# Patient Record
Sex: FEMALE | Race: WHITE | NOT HISPANIC OR LATINO | Employment: PART TIME | URBAN - METROPOLITAN AREA
[De-identification: names, ages, dates, MRNs, and addresses within clinical notes are randomized per-mention and may not be internally consistent; named-entity substitution may affect disease eponyms.]

---
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
## 2023-03-15 PROBLEM — R53.83 FATIGUE: Status: ACTIVE | Noted: 2023-03-15

## 2023-03-15 PROBLEM — N94.6 DYSMENORRHEA: Status: ACTIVE | Noted: 2023-03-15

## 2023-03-15 PROBLEM — E55.9 VITAMIN D DEFICIENCY: Status: ACTIVE | Noted: 2023-03-15

## 2023-03-15 PROBLEM — F32.A DEPRESSION: Status: ACTIVE | Noted: 2023-03-15

## 2023-03-15 PROBLEM — E66.9 OBESITY, CLASS I, BMI 30-34.9: Status: ACTIVE | Noted: 2023-03-15

## 2023-03-15 PROBLEM — J30.9 ALLERGIC RHINITIS: Status: ACTIVE | Noted: 2023-03-15

## 2023-03-15 PROBLEM — A49.8 CLOSTRIDIOIDES DIFFICILE INFECTION: Status: ACTIVE | Noted: 2023-03-15

## 2023-03-15 PROBLEM — E78.2 HYPERCHOLESTEROLEMIA WITH HYPERTRIGLYCERIDEMIA: Status: ACTIVE | Noted: 2023-03-15

## 2023-03-15 PROBLEM — R10.9 ABDOMINAL PAIN: Status: ACTIVE | Noted: 2023-03-15

## 2023-03-15 PROBLEM — E66.811 OBESITY, CLASS I, BMI 30-34.9: Status: ACTIVE | Noted: 2023-03-15

## 2023-03-15 PROBLEM — E28.2 POLYCYSTIC OVARIAN SYNDROME: Status: ACTIVE | Noted: 2023-03-15

## 2023-03-15 PROBLEM — L70.9 ACNE: Status: ACTIVE | Noted: 2023-03-15

## 2023-03-15 PROBLEM — M79.671 PAIN ASSOCIATED WITH ACCESSORY NAVICULAR BONE OF RIGHT FOOT: Status: ACTIVE | Noted: 2023-03-15

## 2023-03-15 PROBLEM — Q74.2 PAIN ASSOCIATED WITH ACCESSORY NAVICULAR BONE OF RIGHT FOOT: Status: ACTIVE | Noted: 2023-03-15

## 2023-03-15 PROBLEM — R63.5 WEIGHT GAIN: Status: ACTIVE | Noted: 2023-03-15

## 2023-03-15 PROBLEM — R63.4 WEIGHT LOSS: Status: ACTIVE | Noted: 2023-03-15

## 2023-03-15 PROBLEM — F41.9 ANXIETY: Status: ACTIVE | Noted: 2023-03-15

## 2023-03-15 PROBLEM — N94.3 PMS (PREMENSTRUAL SYNDROME): Status: ACTIVE | Noted: 2023-03-15

## 2023-03-15 PROBLEM — A04.72 CLOSTRIDIUM DIFFICILE COLITIS: Status: ACTIVE | Noted: 2023-03-15

## 2023-03-15 PROBLEM — L68.0 HIRSUTISM: Status: ACTIVE | Noted: 2023-03-15

## 2023-03-15 PROBLEM — R19.7 DIARRHEA: Status: ACTIVE | Noted: 2023-03-15

## 2023-03-15 PROBLEM — E34.9 FEMALE HYPERTESTOSTERONEMIA: Status: ACTIVE | Noted: 2023-03-15

## 2023-03-15 PROBLEM — F98.8 ATTENTION DEFICIT DISORDER WITHOUT HYPERACTIVITY: Status: ACTIVE | Noted: 2023-03-15

## 2023-03-15 PROBLEM — F32.5 MAJOR DEPRESSIVE DISORDER WITH SINGLE EPISODE, IN FULL REMISSION (CMS-HCC): Status: ACTIVE | Noted: 2023-03-15

## 2023-03-15 PROBLEM — R09.A2 GLOBUS SENSATION: Status: ACTIVE | Noted: 2023-03-15

## 2023-03-15 PROBLEM — H10.45 CHRONIC ALLERGIC CONJUNCTIVITIS: Status: ACTIVE | Noted: 2023-03-15

## 2023-03-15 PROBLEM — N92.0 MENORRHAGIA WITH REGULAR CYCLE: Status: ACTIVE | Noted: 2023-03-15

## 2023-03-15 RX ORDER — ETONOGESTREL AND ETHINYL ESTRADIOL VAGINAL RING .015; .12 MG/D; MG/D
RING VAGINAL
COMMUNITY
Start: 2022-06-09 | End: 2023-10-25 | Stop reason: SDUPTHER

## 2023-03-15 RX ORDER — METFORMIN HYDROCHLORIDE 500 MG/1
2 TABLET ORAL 2 TIMES DAILY
COMMUNITY
Start: 2022-08-16 | End: 2024-05-24 | Stop reason: ALTCHOICE

## 2023-03-15 RX ORDER — FLUOXETINE HYDROCHLORIDE 20 MG/1
1 CAPSULE ORAL DAILY
COMMUNITY
Start: 2021-08-20 | End: 2023-10-23

## 2023-03-23 ENCOUNTER — OFFICE VISIT (OUTPATIENT)
Dept: PEDIATRICS | Facility: CLINIC | Age: 18
End: 2023-03-23
Payer: COMMERCIAL

## 2023-03-23 VITALS
DIASTOLIC BLOOD PRESSURE: 74 MMHG | WEIGHT: 169.2 LBS | HEART RATE: 91 BPM | HEIGHT: 63 IN | SYSTOLIC BLOOD PRESSURE: 118 MMHG | BODY MASS INDEX: 29.98 KG/M2

## 2023-03-23 DIAGNOSIS — J30.89 SEASONAL ALLERGIC RHINITIS DUE TO OTHER ALLERGIC TRIGGER: Primary | ICD-10-CM

## 2023-03-23 DIAGNOSIS — L70.8 OTHER ACNE: ICD-10-CM

## 2023-03-23 DIAGNOSIS — E28.2 PCOS (POLYCYSTIC OVARIAN SYNDROME): ICD-10-CM

## 2023-03-23 DIAGNOSIS — F90.0 ATTENTION DEFICIT HYPERACTIVITY DISORDER (ADHD), PREDOMINANTLY INATTENTIVE TYPE: ICD-10-CM

## 2023-03-23 DIAGNOSIS — F41.9 ANXIETY: Chronic | ICD-10-CM

## 2023-03-23 PROCEDURE — 99395 PREV VISIT EST AGE 18-39: CPT | Performed by: PEDIATRICS

## 2023-03-23 RX ORDER — LEVOCETIRIZINE DIHYDROCHLORIDE 2.5 MG/5ML
5 SOLUTION ORAL DAILY
Qty: 148 ML | Refills: 12 | Status: SHIPPED | OUTPATIENT
Start: 2023-03-23 | End: 2023-04-22

## 2023-03-23 NOTE — PROGRESS NOTES
"The patient is here alone today for routine health maintenance.   General Health: Overall in good health  Concerns today: HAVING A HARD TIME LOSING WEIGHT.   Significant PMHx: (1) PCOS, OVERWEIGHT, INSULIN INSENSITIVITY. HAD A TERRIBLE REACTION TO METFORMIN (FELT LIKE WHEN SHE HAD C DIFF). (2) ADD- NO MEDS NOW (3) DYSMEORRHEA- NUVA RING (3) ANXIETY- NOT ON FLUOXETINE ANYMORE (4) ACNE AND HIRSUITSM (AS PART OF PCOS)- NOT ON SPIRONOLACTONE ANYMORE  Interim Hx: SAW NEW ENDO, DICUSSED WEGOVY, GETTING NEW NUTRITIONIST.     Nutrition: EATS 1200 STANLEY/ DAY.   Dental Care: Has a dental home. Performs regular dental hygiene.  Sleep: USED TO GET 12 HRS/ NIGHT. NOW 6-8 HRS/ NIGHT. WAKES AT 3AM USUALLY AND TAKES AN HOUR TO FALL BACK ASLEEP.  Behavior/ Socialization: Appropriate peer relationships. Parent-child-sibling interactions are normal. Has supportive adult relationship(s). Lives at [facility] IN [place].   Developmental: Does not receive educational accommodations. Social interaction is age-appropriate.   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  Work:  AT [employer]  Activities:   Risk Assessment: Teen questionnaire completed and did not flag as abnormal.  Menstrual Status: Periods are regular Q month. USING NUVA-RING. LMP 3/10. KEEPS AN KRISTIN.  Sex: DENIES  Drugs/Alcohol Use: DENIES. HAS A 4MO KITTEN \"HARJINDER\" (EMOTIONAL SUPPORT PET), GOT HER 12 WEEKS AGO.   Mental Health Assessment: Screening questionnaire for depression negative. Does not endorse thoughts of suicide or self-harm.  Safety: Uses safety belts in cars.     ROS:  Denies headaches, dizziness, syncope/ near syncope, stuffy/runny nose, sneezing, sore throat, coughing, chest pain, abnormal heart rate, abdominal pain, N/V/D, rashes, pain in extremities.      /74   Pulse 91   Ht 1.588 m (5' 2.5\")   Wt 76.7 kg (169 lb 3.2 oz)   BMI 30.45 kg/m²   Growth percentiles: Facility age limit for growth %deborah is 18 years. Facility age limit for " growth %deborah is 18 years.   Constitutional: Well-developed, well nourished, adequately hydrated. No acute distress.   Head/face: Normocephalic, atraumatic.  Eyes: Conjunctivae, sclerae, and lids WNL bilaterally. PERRL. EOMI.  ENT: No nasal discharge, TMs with normal color, landmarks, and reflectivity, without MEEs or retraction. EACs without edema, redness, or tenderness. Dentition intact. MMM. Tonsils WNL.  Neck: FROM, no significant cervical SARAH.  CV: Normal S1 and S2, RRR without M/R/G.  Pulm: No G/F/R. Easy, unlabored respirations without W/R/R/C. Good air exchange all over.   Abd: Soft, NT/ND, no HSM, no masses. Normal BS and tympany on exam.  : Normal exam for stated age and gender.  Neuro: CN grossly intact. Normal gait. Reflexes 2+ and symmetric.  Psych: Mood and affect normal.     Healthy young adult!!  - Vaccines today: NONE NEEDED  - ACNE: YOUR COMPLEXION LOOKS GREAT. GLAD YOU DON'T NEED MEDS ANYMORE.  - PCOS: CONTINUE NUVA-RING AND MORE FORWARD WITH NUTRITIONIST.  - ADD: LET ME KNOW IF YOU FEEL MEDS ARE NEEDED AGAIN AT ANY TIME  - ANXIETY/STRESS: SO PROUD OF YOU FOR CONQUERING THIS.  - ALLERGIES: LET'S TRY XYZAL AS A LIQUID INSTEAD OF THE PILL THAT GAGS YOU  - WEIGHT: FOCUS ON BEING HEALTHY NOT ON THE NUMBER  - See you next year.   Vanessa Saenz MD

## 2023-07-21 DIAGNOSIS — J30.9 ALLERGIC RHINITIS, UNSPECIFIED: ICD-10-CM

## 2023-07-24 RX ORDER — LEVOCETIRIZINE DIHYDROCHLORIDE 5 MG/1
5 TABLET, FILM COATED ORAL DAILY
Qty: 90 TABLET | Refills: 5 | Status: SHIPPED | OUTPATIENT
Start: 2023-07-24 | End: 2024-05-23 | Stop reason: SDUPTHER

## 2024-01-22 ENCOUNTER — TELEPHONE (OUTPATIENT)
Dept: OBSTETRICS AND GYNECOLOGY | Facility: CLINIC | Age: 19
End: 2024-01-22
Payer: COMMERCIAL

## 2024-01-22 NOTE — TELEPHONE ENCOUNTER
Macaela Salomon called in requesting for a different BC to be sent to her pharmacy as she was currently on the Nuva ring but the insurance switched her to Etonogestrel-ethinyl Estradiol Ring and it has given her a lot of side effects.  Side effects include heavy flow, cramping bloating acne and intents sugar cravings. Wants to know what she can do?    OPAL COOK MA

## 2024-01-29 DIAGNOSIS — Z30.44 ENCOUNTER FOR SURVEILLANCE OF VAGINAL RING HORMONAL CONTRACEPTIVE DEVICE: Primary | ICD-10-CM

## 2024-01-29 RX ORDER — ETONOGESTREL AND ETHINYL ESTRADIOL VAGINAL RING .015; .12 MG/D; MG/D
1 RING VAGINAL
Qty: 3 EACH | Refills: 1 | Status: SHIPPED | OUTPATIENT
Start: 2024-01-29 | End: 2024-01-31 | Stop reason: SDUPTHER

## 2024-01-31 RX ORDER — ETONOGESTREL AND ETHINYL ESTRADIOL VAGINAL RING .015; .12 MG/D; MG/D
1 RING VAGINAL
Qty: 3 EACH | Refills: 1 | Status: SHIPPED | OUTPATIENT
Start: 2024-01-31 | End: 2025-01-30

## 2024-04-10 DIAGNOSIS — R11.2 NAUSEA AND VOMITING, UNSPECIFIED VOMITING TYPE: Primary | ICD-10-CM

## 2024-04-10 RX ORDER — ONDANSETRON 4 MG/1
4 TABLET, ORALLY DISINTEGRATING ORAL EVERY 8 HOURS PRN
Qty: 20 TABLET | Refills: 0 | Status: SHIPPED | OUTPATIENT
Start: 2024-04-10 | End: 2024-04-17

## 2024-05-23 ENCOUNTER — OFFICE VISIT (OUTPATIENT)
Dept: PEDIATRICS | Facility: CLINIC | Age: 19
End: 2024-05-23
Payer: COMMERCIAL

## 2024-05-23 VITALS
WEIGHT: 137 LBS | BODY MASS INDEX: 24.27 KG/M2 | HEART RATE: 62 BPM | DIASTOLIC BLOOD PRESSURE: 72 MMHG | HEIGHT: 63 IN | SYSTOLIC BLOOD PRESSURE: 103 MMHG

## 2024-05-23 DIAGNOSIS — N94.6 DYSMENORRHEA: ICD-10-CM

## 2024-05-23 DIAGNOSIS — F41.9 ANXIETY: ICD-10-CM

## 2024-05-23 DIAGNOSIS — R09.A2 GLOBUS SENSATION: ICD-10-CM

## 2024-05-23 DIAGNOSIS — R53.83 OTHER FATIGUE: ICD-10-CM

## 2024-05-23 DIAGNOSIS — Z00.00 WELL ADULT EXAM: Primary | ICD-10-CM

## 2024-05-23 DIAGNOSIS — F32.A DEPRESSION, UNSPECIFIED DEPRESSION TYPE: ICD-10-CM

## 2024-05-23 DIAGNOSIS — J30.9 ALLERGIC RHINITIS, UNSPECIFIED: ICD-10-CM

## 2024-05-23 DIAGNOSIS — E28.2 POLYCYSTIC OVARIAN SYNDROME: ICD-10-CM

## 2024-05-23 DIAGNOSIS — J30.89 ALLERGIC RHINITIS DUE TO OTHER ALLERGIC TRIGGER, UNSPECIFIED SEASONALITY: ICD-10-CM

## 2024-05-23 DIAGNOSIS — R63.4 WEIGHT LOSS: ICD-10-CM

## 2024-05-23 DIAGNOSIS — K21.9 GASTROESOPHAGEAL REFLUX DISEASE, UNSPECIFIED WHETHER ESOPHAGITIS PRESENT: ICD-10-CM

## 2024-05-23 DIAGNOSIS — N94.3 PMS (PREMENSTRUAL SYNDROME): ICD-10-CM

## 2024-05-23 PROCEDURE — 99395 PREV VISIT EST AGE 18-39: CPT | Performed by: PEDIATRICS

## 2024-05-23 RX ORDER — LEVOCETIRIZINE DIHYDROCHLORIDE 5 MG/1
5 TABLET, FILM COATED ORAL DAILY
Qty: 90 TABLET | Refills: 11 | Status: SHIPPED | OUTPATIENT
Start: 2024-05-23

## 2024-05-23 NOTE — PROGRESS NOTES
"The patient is here alone today for routine health maintenance.   General Health: Overall in good health  Concerns today: HAD C DIFF A WHILE AGO AND THEN RECENTLY NEEDED HOSPITAL ADMISSION FOR A RECURRENCE. ALSO HAD A GASTRIC ULCER. FEELS HER ESOPHAGUS IS TWITCHY AND SHE CAN'T SWALLOW. HAS PTSD-LIKE SX'S WITH HAVING TO SWALLOW. WONDERS IF HER CORTISOL LEVELS ARE ELEVATED SINCE SHE'S FEELING VERY TRIGGERED LATELY. ALSO DAD HAD FOLLICULITIS AND SHE HAS BACK ACNE-- SAME THING?   Significant PMHx: PCOS-- SEMIGLUTIDE (HAS LOST 30# IN A YEAR), FAILED METFORMIN (\"ALLERGIC REACTION\", SHE SAYS \"IT MAKES ME SICK\"), NUVA-RING; SEASONAL ALLERGIES- ON XYZAL; GERD- OMEPRAZOLE 20MG Q DAY; ANXIETY- D/C'D FLUOXETINE END OF LAST YEAR. SAYS \"THERAPY WAS HELPING\"  Interim Hx: SAYS SHE LOST 20 LBS FROM BEING FEARFUL OF EATING AFTER HOSPITAL ADMISSION.     Nutrition: HAS TROUBLE EATING. FEELS LIKE HER THROAT GETS TOO MOIST WHEN SHE EATS AND NOT QUITE LIKE DROWNING BUT HARDER TO MOVE FOOD THROUGH.   Elimination: 2-3X/DAY SOFT BUT SOLID STOOLS.   Dental Care: Has a dental home. Performs regular dental hygiene.  Sleep: HS 10PM. WAKES 2-4AM ALWAYS. AGAIN AT 7:30-8AM.   Behavior/ Socialization: Appropriate peer relationships. Parent-child-sibling interactions are normal. Has supportive adult relationship(s). Lives at [facility].   Developmental: Does not receive educational accommodations. Social interaction is age-appropriate.   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   Work: DIRECTING FILMS AT SCHOOL.   Activities: EXERCISES  Risk Assessment: Teen questionnaire completed and did not flag as abnormal.  Menstrual Status: Periods are regular Q month. LMP 5/2. CYCLES WITH NUVA-RING.   Sex: DENIES CURRENTLY. DID IN THE PAST.   Drugs/Alcohol Use:   Mental Health Assessment: Screening questionnaire for depression negative. Does not endorse thoughts of suicide or self-harm.  Safety: Uses safety belts in cars. " "    ROS:  Denies headaches, dizziness, syncope/ near syncope, stuffy/runny nose, sneezing, sore throat, coughing, chest pain, abnormal heart rate, abdominal pain, N/V/D, rashes, pain in extremities.      /72 (BP Location: Left arm)   Pulse 62   Ht 1.588 m (5' 2.5\")   Wt 62.1 kg (137 lb)   BMI 24.66 kg/m²   Growth percentiles: Facility age limit for growth %deborah is 18 years. Facility age limit for growth %deborah is 18 years.   Constitutional: Well-developed, well nourished, adequately hydrated. No acute distress.   Head/face: Normocephalic, atraumatic.  Eyes: Conjunctivae, sclerae, and lids WNL bilaterally. PERRL. EOMI.  ENT: No nasal discharge, TMs with normal color, landmarks, and reflectivity, without MEEs or retraction. EACs without edema, redness, or tenderness. Dentition intact. MMM. Tonsils WNL.  Neck: FROM, no significant cervical SARAH.  CV: Normal S1 and S2, RRR without M/R/G.  Pulm: No G/F/R. Easy, unlabored respirations without W/R/R/C. Good air exchange all over.   Abd: Soft, NT/ND, no HSM, no masses. Normal BS and tympany on exam.  : Normal exam for stated age and gender.  Neuro: CN grossly intact. Normal gait. Reflexes 2+ and symmetric.  Psych: Mood and affect normal.   Skin: ACNE ON BACK>CHEST    Healthy young adult!!  - Vaccines today: NONE NEEDED  - ANXIETY AND DEPRESSION: GLAD TO HEAR YOU HAVEN'T NEEDED MEDICATION. CONTINUE TALK THERAPY.   - SLEEP DISRUPTION: TRY AN KRISTIN LIKE HEADSPACE OR CALM OR JUST FIND A VIDEO ONLINE ABOUT HOW TO RELAX USING POSITIVE IMAGERY AND A BODY SCAN TO HELP YOU OVERNIGHT.   - WEIGHT LOSS: OVERALL THIS IS GOOD NEWS BUT I'M WORRIED THAT YOU SAY YOU'RE \"TERRIFIED OF FOOD.\" ADDRESS THIS WITH GI. COULD BE EOSINOPHILIC ESOPHAGITIS (EOE) OR SPASMING OF THE ESOPHAGUS. CONTINUE PROBIOTIC.   - GERD: CONTINUE OMEPRAZOLE, BUT YOU MIGHT NEED TO DOUBLE THE DOSE (TAKE IT TWICE DAILY). SOMETIMES REGLAN IS USED TO HELP ENHANCE GUT MOTILITY. CARAFATE CAN BE USED TO COAT THE " STOMACH WHEN THERE'S AN OVER-PRODUCTION OF ACID. I'M ON BOARD WITH A SCOPE, THIS CAN RULE OUT EOE.   - CORTISOL CONCERNS: THIS IS AN ISSUE FOR ENDOCRINOLOGY.   - ALTERNATIVE OPTIONS: WESTERN MEDICINE CAN ONLY DO SO MUCH. CHIROPRACTICS, THERAPEUTIC MASSAGE, ACCUPRESSURE/PUNCTURE, GUIDED MEDITATION CAN ALL HELP WITH VAGUE SYMPTOMS OF FATIGUE, BLOATING, ETC.   - ADHD: GLAD TO HEAR YOU'VE BEEN ABLE TO MANAGE THIS IN ADULTHOOD  - ALLERGIES: WE'LL REFILL YOUR XYZAL TODAY.   - ACNE ON YOUR BACK: YOU CAN TRY A 10% BENZOYL PEROXIDE (OTC) WASH ON A LOOFAH IN THE SHOWER. CAREFUL, IT BLEACHES FABRICS.   - SEE YOU NEXT YEAR.   Vanessa Saenz MD

## 2024-05-23 NOTE — PATIENT INSTRUCTIONS
"Healthy young adult!!  - Vaccines today: NONE NEEDED  - ANXIETY AND DEPRESSION: GLAD TO HEAR YOU HAVEN'T NEEDED MEDICATION. CONTINUE TALK THERAPY.   - SLEEP DISRUPTION: TRY AN KRISTIN LIKE Ernie's OR CALM OR JUST FIND A VIDEO ONLINE ABOUT HOW TO RELAX USING POSITIVE IMAGERY AND A BODY SCAN TO HELP YOU OVERNIGHT.   - WEIGHT LOSS: OVERALL THIS IS GOOD NEWS BUT I'M WORRIED THAT YOU SAY YOU'RE \"TERRIFIED OF FOOD.\" ADDRESS THIS WITH GI. COULD BE EOSINOPHILIC ESOPHAGITIS (EOE) OR SPASMING OF THE ESOPHAGUS. CONTINUE PROBIOTIC.   - GERD: CONTINUE OMEPRAZOLE, BUT YOU MIGHT NEED TO DOUBLE THE DOSE (TAKE IT TWICE DAILY). SOMETIMES REGLAN IS USED TO HELP ENHANCE GUT MOTILITY. CARAFATE CAN BE USED TO COAT THE STOMACH WHEN THERE'S AN OVER-PRODUCTION OF ACID. I'M ON BOARD WITH A SCOPE, THIS CAN RULE OUT EOE.   - CORTISOL CONCERNS: THIS IS AN ISSUE FOR ENDOCRINOLOGY.   - ALTERNATIVE OPTIONS: WESTERN MEDICINE CAN ONLY DO SO MUCH. CHIROPRACTICS, THERAPEUTIC MASSAGE, ACCUPRESSURE/PUNCTURE, GUIDED MEDITATION CAN ALL HELP WITH VAGUE SYMPTOMS OF FATIGUE, BLOATING, ETC.   - ADHD: GLAD TO HEAR YOU'VE BEEN ABLE TO MANAGE THIS IN ADULTHOOD  - ALLERGIES: WE'LL REFILL YOUR XYZAL TODAY.   - ACNE ON YOUR BACK: YOU CAN TRY A 10% BENZOYL PEROXIDE (OTC) WASH ON A LOOFAH IN THE SHOWER. CAREFUL, IT BLEACHES FABRICS.   - SEE YOU NEXT YEAR.   "

## 2024-05-24 ENCOUNTER — APPOINTMENT (OUTPATIENT)
Dept: PEDIATRICS | Facility: CLINIC | Age: 19
End: 2024-05-24
Payer: COMMERCIAL

## 2024-05-24 ENCOUNTER — OFFICE VISIT (OUTPATIENT)
Dept: GASTROENTEROLOGY | Facility: CLINIC | Age: 19
End: 2024-05-24
Payer: COMMERCIAL

## 2024-05-24 VITALS — BODY MASS INDEX: 24.27 KG/M2 | WEIGHT: 137 LBS | HEIGHT: 63 IN

## 2024-05-24 DIAGNOSIS — R13.10 DYSPHAGIA, UNSPECIFIED TYPE: ICD-10-CM

## 2024-05-24 DIAGNOSIS — R11.2 NAUSEA AND VOMITING, UNSPECIFIED VOMITING TYPE: ICD-10-CM

## 2024-05-24 DIAGNOSIS — K21.9 GASTROESOPHAGEAL REFLUX DISEASE, UNSPECIFIED WHETHER ESOPHAGITIS PRESENT: Primary | ICD-10-CM

## 2024-05-24 DIAGNOSIS — A04.72 C. DIFFICILE DIARRHEA: ICD-10-CM

## 2024-05-24 PROCEDURE — 1036F TOBACCO NON-USER: CPT | Performed by: INTERNAL MEDICINE

## 2024-05-24 PROCEDURE — 99214 OFFICE O/P EST MOD 30 MIN: CPT | Performed by: INTERNAL MEDICINE

## 2024-05-24 RX ORDER — METOCLOPRAMIDE 10 MG/1
1 TABLET ORAL EVERY 6 HOURS PRN
COMMUNITY
Start: 2024-04-29 | End: 2024-05-30

## 2024-05-24 RX ORDER — OMEPRAZOLE 20 MG/1
20 CAPSULE, DELAYED RELEASE ORAL DAILY
COMMUNITY
End: 2024-05-24 | Stop reason: SDUPTHER

## 2024-05-24 RX ORDER — OMEPRAZOLE 40 MG/1
40 CAPSULE, DELAYED RELEASE ORAL
Qty: 30 CAPSULE | Refills: 3 | Status: SHIPPED | OUTPATIENT
Start: 2024-05-24

## 2024-05-24 ASSESSMENT — ENCOUNTER SYMPTOMS: SHORTNESS OF BREATH: 0

## 2024-05-24 NOTE — PATIENT INSTRUCTIONS
Increase to Omeprazole 40 mg daily. Schedule upper endoscopy. If recurrent diarrhea, then call the office and we will recheck for C. Diff.

## 2024-05-24 NOTE — PROGRESS NOTES
REASON FOR VISIT:  Recurrent C. Diff   PCP (requesting provider): Vanessa Saenz MD.    HPI:  Florecita Cabral is a 19 y.o. female with a past medical history of PCOS, ADHD, anxiety, and prior C. diff (3/2022) being evaluated for GERD. H. pylori stool Ag negative (3/2022, 4/2024). RUQ U/S unremarkable (4/2024). C. Diff PCR positive but negative for toxin indicative of colonization (4/2024). CT A/P w/ contrast showed mild wall thickening of the ascending colon (4/2024). Fecal calprotectin negative (3/2022).     The patient reports she went to [facility] as she goes to school in [place] after two ED visits. She saw [facility] ER and was told they thought she may have acid reflux. She reports she was given a GI cocktail. She was also having nausea. She is on Omeprazole with improvement. However, she feels like it is difficult to breathe and that her esophagus is tight. She feels the food going down the esophagus but not frankly stuck. She does regurgitate food. She is taking Omeprazole once daily in the morning. She has a BM once daily and it is formed but soft. No blood in the stool. No regular NSAIDs. No prior EGD or colonoscopy. She has been under a lot of stress at school. She is taking Omeprazole 20 mg daily currently.     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    Accessory navicular bone of right foot    Pain in left finger(s) 02/24/2014    Pain of left thumb    Pain in right ankle and joints of  right foot 10/05/2016    Acute right ankle pain    Pain in right foot 12/11/2018    Right foot pain    Pain in unspecified ankle and joints of unspecified foot 10/10/2018    Ankle pain    Pain in unspecified foot 12/18/2018    Foot pain    Personal history of diseases of the skin and subcutaneous tissue 03/07/2016    History of cellulitis    Personal history of other diseases of the female genital tract 06/11/2018    History of polycystic ovarian syndrome    Personal history of other diseases of the nervous system and sense organs     History of amblyopia    Personal history of other diseases of the respiratory system 03/17/2015    History of sinusitis    Personal history of other diseases of the respiratory system 11/25/2022    History of allergic rhinitis    Personal history of traumatic brain injury 08/01/2013    History of concussion    Unspecified disorder of eye and adnexa     Eye problem    Unspecified injury of other specified muscles, fascia and tendons at wrist and hand level, unspecified hand, initial encounter 03/18/2014    Injury of ulnar collateral ligament of wrist    Unspecified injury of unspecified wrist, hand and finger(s), initial encounter 02/24/2014    Thumb injury       PAST SURGICAL HISTORY  No past surgical history on file.    FAMILY HISTORY  Family History   Problem Relation Name Age of Onset    Thyroid disease Mother      Other (cardiac disorder) Other      Diabetes Other      Breast cancer Other      Hyperlipidemia Other         SOCIAL HISTORY   has no history on file for tobacco use, alcohol use, and drug use.    REVIEW OF SYSTEMS  Review of Systems   Respiratory:  Negative for shortness of breath.    Cardiovascular:  Negative for chest pain.   All other systems reviewed and are negative.    A 10+ point review of systems was otherwise negative except as noted and per HPI.    ALLERGIES  Allergies   Allergen Reactions    Penicillins Hives       MEDICATIONS  Current Outpatient Medications    Medication Instructions    etonogestreL-ethinyl estradioL (Nuvaring) 0.12-0.015 mg/24 hr vaginal ring 1 each, vaginal, Every 28 days, Insert vaginal ring for 3 weeks, then remove for 1 week.    FLUoxetine (PROZAC) 20 mg, oral, Daily    levocetirizine (XYZAL) 5 mg, oral, Daily    levocetirizine (XYZAL) 5 mg, oral, Daily    metFORMIN (Glucophage) 500 mg tablet 2 tablets, oral, 2 times daily       VITALS  There were no vitals filed for this visit.   There is no height or weight on file to calculate BMI.    PHYSICAL EXAM  CONSTITUTIONAL: NAD, appears stated age  EYES: anicteric sclera, sclera clear  HEAD: normocephalic, atraumatic   NECK: supple   PULMONARY: CTAB  CARDIOVASCULAR: RRR, no M/R/G appreciated   ABDOMEN: soft, NTND, +BS, no rebound or guarding   MUSCULOSKELETAL: no edema  SKIN: no jaundice   PSYCHIATRIC: AOx3, appropriate insight and judgement    LABS  WBC (x10E9/L)   Date Value   06/25/2021 7.2   04/22/2019 9.3     Hemoglobin (g/dL)   Date Value   06/25/2021 13.6   04/22/2019 14.1     Platelets (x10E9/L)   Date Value   06/25/2021 368   04/22/2019 379     Sodium (mmol/L)   Date Value   08/04/2022 139   06/25/2021 138   04/22/2019 141     Potassium (mmol/L)   Date Value   08/04/2022 4.4   06/25/2021 4.3   04/22/2019 4.2     Chloride (mmol/L)   Date Value   08/04/2022 103   06/25/2021 105   04/22/2019 104     Bicarbonate (mmol/L)   Date Value   08/04/2022 25   06/25/2021 27   04/22/2019 27     Urea Nitrogen (mg/dL)   Date Value   08/04/2022 8   06/25/2021 11   04/22/2019 9     Creatinine (mg/dL)   Date Value   08/04/2022 0.56   06/25/2021 0.57   04/22/2019 0.62     Calcium (mg/dL)   Date Value   08/04/2022 9.4   06/25/2021 9.6   04/22/2019 9.8     Total Protein (g/dL)   Date Value   08/04/2022 7.5   06/25/2021 6.9   04/22/2019 7.4     Total Bilirubin (mg/dL)   Date Value   08/04/2022 0.5   06/25/2021 0.3   04/22/2019 0.4     Alkaline Phosphatase (U/L)   Date Value   08/04/2022 71   06/25/2021 63   04/22/2019  85     ALT (SGPT) (U/L)   Date Value   08/04/2022 15   06/25/2021 15   04/22/2019 14     AST (U/L)   Date Value   08/04/2022 12   06/25/2021 14   04/22/2019 15     Glucose (mg/dL)   Date Value   08/04/2022 75   06/25/2021 89   04/22/2019 93     CRP (mg/dL)   Date Value   04/22/2019 <0.10       ASSESSMENT/PLAN  Florecita Cabral is a 19 y.o. female with a past medical history of PCOS, ADHD, anxiety, and prior C. diff (3/2022) being evaluated for GERD. H. pylori stool Ag negative (3/2022, 4/2024). RUQ U/S unremarkable (4/2024). C. Diff PCR positive but negative for toxin indicative of colonization (4/2024). CT A/P w/ contrast showed mild apparent wall thickening of the ascending colon (4/2024). Fecal calprotectin negative (3/2022). She does not have any ongoing diarrhea so low suspicion this is toxigenic C. Diff but rather it represents colonization. We will monitor this for now and if recurrent diarrhea then we can certainly recheck C. Diff. The colonic wall thickening most likely represents under-distension as opposed to true colitis. More pressing symptoms are the dysphagia, GERD, heartburn, and N/V despite trial of PPI. We will schedule an EGD to evaluate for EoE, esophagitis, hiatal hernia, and PUD. Trial higher dose PPI in the interim.    -Plan for EGD including biopsies for EoE if otherwise normal   -The procedure(s) including risks/benefits, diet restrictions, prep, and sedation were discussed with the patient  -Increase to Omeprazole 40 mg daily   -Advised patient to call the office if recurrent diarrhea as we will then recheck C. Diff     Follow-up will be at the time of the colonoscopy.    Signature: Luc Pandey MD

## 2024-06-18 ENCOUNTER — APPOINTMENT (OUTPATIENT)
Dept: GASTROENTEROLOGY | Facility: EXTERNAL LOCATION | Age: 19
End: 2024-06-18
Payer: COMMERCIAL

## 2024-06-18 DIAGNOSIS — R10.13 EPIGASTRIC PAIN: Primary | ICD-10-CM

## 2024-06-18 DIAGNOSIS — K21.9 GASTROESOPHAGEAL REFLUX DISEASE, UNSPECIFIED WHETHER ESOPHAGITIS PRESENT: ICD-10-CM

## 2024-06-18 DIAGNOSIS — R11.2 NAUSEA AND VOMITING, UNSPECIFIED VOMITING TYPE: ICD-10-CM

## 2024-06-18 DIAGNOSIS — R10.10 UPPER ABDOMINAL PAIN, UNSPECIFIED: ICD-10-CM

## 2024-06-18 DIAGNOSIS — R19.7 DIARRHEA, UNSPECIFIED TYPE: ICD-10-CM

## 2024-06-18 DIAGNOSIS — R13.10 DYSPHAGIA, UNSPECIFIED TYPE: ICD-10-CM

## 2024-06-18 PROCEDURE — 88305 TISSUE EXAM BY PATHOLOGIST: CPT

## 2024-06-18 PROCEDURE — 43239 EGD BIOPSY SINGLE/MULTIPLE: CPT | Performed by: INTERNAL MEDICINE

## 2024-06-19 ENCOUNTER — APPOINTMENT (OUTPATIENT)
Dept: GASTROENTEROLOGY | Facility: CLINIC | Age: 19
End: 2024-06-19
Payer: COMMERCIAL

## 2024-06-21 ENCOUNTER — LAB REQUISITION (OUTPATIENT)
Dept: LAB | Facility: HOSPITAL | Age: 19
End: 2024-06-21
Payer: COMMERCIAL

## 2024-06-24 ENCOUNTER — TELEPHONE (OUTPATIENT)
Dept: OBSTETRICS AND GYNECOLOGY | Facility: CLINIC | Age: 19
End: 2024-06-24
Payer: COMMERCIAL

## 2024-06-24 NOTE — TELEPHONE ENCOUNTER
Called patient and left voicemail in regards to getting appointment rescheduled as Dr. Patel will be out of office.    OPAL COOK MA

## 2024-07-02 LAB
LABORATORY COMMENT REPORT: NORMAL
PATH REPORT.FINAL DX SPEC: NORMAL
PATH REPORT.GROSS SPEC: NORMAL
PATH REPORT.RELEVANT HX SPEC: NORMAL
PATH REPORT.TOTAL CANCER: NORMAL

## 2024-07-03 ENCOUNTER — APPOINTMENT (OUTPATIENT)
Dept: GASTROENTEROLOGY | Facility: EXTERNAL LOCATION | Age: 19
End: 2024-07-03
Payer: COMMERCIAL

## 2024-07-15 DIAGNOSIS — Z30.44 ENCOUNTER FOR SURVEILLANCE OF VAGINAL RING HORMONAL CONTRACEPTIVE DEVICE: ICD-10-CM

## 2024-07-15 RX ORDER — ETONOGESTREL AND ETHINYL ESTRADIOL VAGINAL RING .015; .12 MG/D; MG/D
RING VAGINAL
Qty: 3 EACH | Refills: 0 | Status: SHIPPED | OUTPATIENT
Start: 2024-07-15

## 2024-07-28 ENCOUNTER — PATIENT MESSAGE (OUTPATIENT)
Dept: OBSTETRICS AND GYNECOLOGY | Facility: CLINIC | Age: 19
End: 2024-07-28
Payer: COMMERCIAL

## 2024-07-30 ENCOUNTER — APPOINTMENT (OUTPATIENT)
Dept: ENDOCRINOLOGY | Facility: CLINIC | Age: 19
End: 2024-07-30
Payer: COMMERCIAL

## 2024-07-30 ENCOUNTER — LAB (OUTPATIENT)
Dept: LAB | Facility: LAB | Age: 19
End: 2024-07-30
Payer: COMMERCIAL

## 2024-07-30 VITALS
RESPIRATION RATE: 16 BRPM | BODY MASS INDEX: 25.59 KG/M2 | DIASTOLIC BLOOD PRESSURE: 62 MMHG | SYSTOLIC BLOOD PRESSURE: 110 MMHG | HEIGHT: 63 IN | HEART RATE: 78 BPM | WEIGHT: 144.4 LBS

## 2024-07-30 DIAGNOSIS — E28.2 POLYCYSTIC OVARIAN SYNDROME: Primary | ICD-10-CM

## 2024-07-30 DIAGNOSIS — R53.83 OTHER FATIGUE: ICD-10-CM

## 2024-07-30 DIAGNOSIS — E28.2 POLYCYSTIC OVARIAN SYNDROME: ICD-10-CM

## 2024-07-30 DIAGNOSIS — L70.9 ACNE, UNSPECIFIED ACNE TYPE: ICD-10-CM

## 2024-07-30 LAB
EST. AVERAGE GLUCOSE BLD GHB EST-MCNC: 91 MG/DL
HBA1C MFR BLD: 4.8 %

## 2024-07-30 PROCEDURE — 1036F TOBACCO NON-USER: CPT | Performed by: INTERNAL MEDICINE

## 2024-07-30 PROCEDURE — 82627 DEHYDROEPIANDROSTERONE: CPT

## 2024-07-30 PROCEDURE — 36415 COLL VENOUS BLD VENIPUNCTURE: CPT

## 2024-07-30 PROCEDURE — 83036 HEMOGLOBIN GLYCOSYLATED A1C: CPT

## 2024-07-30 PROCEDURE — 84402 ASSAY OF FREE TESTOSTERONE: CPT

## 2024-07-30 PROCEDURE — 80053 COMPREHEN METABOLIC PANEL: CPT

## 2024-07-30 PROCEDURE — 99214 OFFICE O/P EST MOD 30 MIN: CPT | Performed by: INTERNAL MEDICINE

## 2024-07-30 PROCEDURE — 84443 ASSAY THYROID STIM HORMONE: CPT

## 2024-07-30 PROCEDURE — 3008F BODY MASS INDEX DOCD: CPT | Performed by: INTERNAL MEDICINE

## 2024-07-30 RX ORDER — PANTOPRAZOLE SODIUM 40 MG/1
1 TABLET, DELAYED RELEASE ORAL
COMMUNITY
Start: 2024-04-08 | End: 2024-07-30 | Stop reason: ALTCHOICE

## 2024-07-30 ASSESSMENT — ENCOUNTER SYMPTOMS
SHORTNESS OF BREATH: 0
FATIGUE: 0
PALPITATIONS: 0
FEVER: 0
HEADACHES: 0
VOMITING: 0
DIARRHEA: 0
CHILLS: 0
COUGH: 0
NAUSEA: 0

## 2024-07-30 NOTE — ASSESSMENT & PLAN NOTE
Check tsh today, not sleeping well is primary issue however  Orders:    TSH with reflex to Free T4 if abnormal; Future

## 2024-07-30 NOTE — PROGRESS NOTES
Endocrinology: Follow up visit  Subjective   Patient ID: Florecita Cabral is a 19 y.o. female who presents for Polycystic Ovary Syndrome.    PCP: Vanessa Saenz MD    HPI  Last seen a year ago for pcos, obesity   Went on semaglutide form a compounded pharmacy for almost a year then had severe gi sxs.  Off it has regained 14 lbs and feels like she craves sugars more than ever.  Also notes recurrence of acne.   Has been on spironolactone in the past with some success, intolerant to metformin.   Still dealing with gi issuses  Review of Systems   Constitutional:  Negative for chills, fatigue and fever.   Respiratory:  Negative for cough and shortness of breath.    Cardiovascular:  Negative for chest pain and palpitations.   Gastrointestinal:  Negative for diarrhea, nausea and vomiting.   Neurological:  Negative for headaches.       Patient Active Problem List   Diagnosis    Abdominal pain    Acne    Allergic rhinitis    Anxiety    Attention deficit disorder without hyperactivity    Chronic allergic conjunctivitis    Clostridioides difficile infection    Clostridium difficile colitis    Depression    Diarrhea    Dysmenorrhea    Fatigue    Female hypertestosteronemia    Hirsutism    Hypercholesterolemia with hypertriglyceridemia    Major depressive disorder with single episode, in full remission (CMS-[facility])    Menorrhagia with regular cycle    Obesity, Class I, BMI 30-34.9    Weight loss    Weight gain    Vitamin D deficiency    Polycystic ovarian syndrome    PMS (premenstrual syndrome)    Pain associated with accessory navicular bone of right foot    Globus sensation        Home Meds:  Current Outpatient Medications   Medication Instructions    etonogestreL-ethinyl estradioL (Nuvaring) 0.12-0.015 mg/24 hr vaginal ring INSERT 1 RING VAGINALLY EVERY 28 DAYS. INSERT VAGINAL RING FOR 3 WEEKS THEN REMOVE FOR 1 WEEK.    FLUoxetine (PROZAC) 20 mg, oral, Daily    levocetirizine (XYZAL) 5 mg, oral, Daily    levocetirizine (XYZAL) 5  "mg, oral, Daily    omeprazole (PRILOSEC) 40 mg, oral, Daily before breakfast        Allergies   Allergen Reactions    Penicillins Hives        Objective   Vitals:    07/30/24 1401   BP: 110/62   Pulse: 78   Resp: 16      Vitals:    07/30/24 1401   Weight: 65.5 kg (144 lb 6.4 oz)      Body mass index is 25.99 kg/m².   Physical Exam  Constitutional:       Appearance: Normal appearance. She is overweight.   HENT:      Head: Normocephalic and atraumatic.   Neck:      Thyroid: No thyroid mass, thyromegaly or thyroid tenderness.   Cardiovascular:      Rate and Rhythm: Normal rate and regular rhythm.      Heart sounds: No murmur heard.     No gallop.   Pulmonary:      Effort: Pulmonary effort is normal.      Breath sounds: Normal breath sounds.   Abdominal:      Palpations: Abdomen is soft.      Comments: benign   Neurological:      General: No focal deficit present.      Mental Status: She is alert and oriented to person, place, and time.      Deep Tendon Reflexes: Reflexes are normal and symmetric.   Psychiatric:         Behavior: Behavior is cooperative.         Labs:  Lab Results   Component Value Date    HGBA1C 5.1 06/25/2021    TSH 0.92 08/04/2022      No results found for: \"PR1\", \"THYROIDPAB\", \"TSI\"     Assessment/Plan   Assessment & Plan  Polycystic ovarian syndrome  Congratulated her on progress with weight  Current bmi doesn't meet criteria for start of new weight loss med  A1c today  Metformin intolerant   Discussed spironolactone to help with acne, will start with androgens  Orders:    Comprehensive Metabolic Panel; Future    Hemoglobin A1C; Future    Testosterone,Free and Total; Future    DHEA-sulfate; Future    Other fatigue  Check tsh today, not sleeping well is primary issue however  Orders:    TSH with reflex to Free T4 if abnormal; Future    Acne, unspecified acne type  Check androgens  Consider restart of spironolactone      Electronically signed by:  Rupali Apodaca MD 07/30/24 2:24 PM              "

## 2024-07-30 NOTE — ASSESSMENT & PLAN NOTE
Congratulated her on progress with weight  Current bmi doesn't meet criteria for start of new weight loss med  A1c today  Metformin intolerant   Discussed spironolactone to help with acne, will start with androgens  Orders:    Comprehensive Metabolic Panel; Future    Hemoglobin A1C; Future    Testosterone,Free and Total; Future    DHEA-sulfate; Future

## 2024-07-31 LAB
ALBUMIN SERPL BCP-MCNC: 4 G/DL (ref 3.4–5)
ALP SERPL-CCNC: 49 U/L (ref 33–110)
ALT SERPL W P-5'-P-CCNC: 12 U/L (ref 7–45)
ANION GAP SERPL CALC-SCNC: 12 MMOL/L (ref 10–20)
AST SERPL W P-5'-P-CCNC: 11 U/L (ref 9–39)
BILIRUB SERPL-MCNC: 0.4 MG/DL (ref 0–1.2)
BUN SERPL-MCNC: 10 MG/DL (ref 6–23)
CALCIUM SERPL-MCNC: 9.2 MG/DL (ref 8.6–10.6)
CHLORIDE SERPL-SCNC: 105 MMOL/L (ref 98–107)
CO2 SERPL-SCNC: 27 MMOL/L (ref 21–32)
CREAT SERPL-MCNC: 0.57 MG/DL (ref 0.5–1.05)
DHEA-S SERPL-MCNC: 66 UG/DL (ref 65–395)
EGFRCR SERPLBLD CKD-EPI 2021: >90 ML/MIN/1.73M*2
GLUCOSE SERPL-MCNC: 79 MG/DL (ref 74–99)
POTASSIUM SERPL-SCNC: 4.7 MMOL/L (ref 3.5–5.3)
PROT SERPL-MCNC: 7.1 G/DL (ref 6.4–8.2)
SODIUM SERPL-SCNC: 139 MMOL/L (ref 136–145)
TSH SERPL-ACNC: 1.55 MIU/L (ref 0.44–3.98)

## 2024-08-03 LAB
TESTOSTERONE FREE (CHAN): 1.3 PG/ML (ref 0.1–6.4)
TESTOSTERONE,TOTAL,LC-MS/MS: 27 NG/DL (ref 2–45)

## 2024-08-26 ENCOUNTER — APPOINTMENT (OUTPATIENT)
Dept: OBSTETRICS AND GYNECOLOGY | Facility: CLINIC | Age: 19
End: 2024-08-26
Payer: COMMERCIAL

## 2024-08-26 VITALS
WEIGHT: 149 LBS | SYSTOLIC BLOOD PRESSURE: 100 MMHG | DIASTOLIC BLOOD PRESSURE: 60 MMHG | HEIGHT: 63 IN | BODY MASS INDEX: 26.4 KG/M2

## 2024-08-26 DIAGNOSIS — Z30.44 ENCOUNTER FOR SURVEILLANCE OF VAGINAL RING HORMONAL CONTRACEPTIVE DEVICE: ICD-10-CM

## 2024-08-26 DIAGNOSIS — Z01.419 WELL WOMAN EXAM WITH ROUTINE GYNECOLOGICAL EXAM: Primary | ICD-10-CM

## 2024-08-26 PROCEDURE — 99395 PREV VISIT EST AGE 18-39: CPT | Performed by: OBSTETRICS & GYNECOLOGY

## 2024-08-26 PROCEDURE — 1036F TOBACCO NON-USER: CPT | Performed by: OBSTETRICS & GYNECOLOGY

## 2024-08-26 PROCEDURE — 3008F BODY MASS INDEX DOCD: CPT | Performed by: OBSTETRICS & GYNECOLOGY

## 2024-08-26 RX ORDER — DOXYCYCLINE HYCLATE 100 MG
1 TABLET ORAL
COMMUNITY
Start: 2024-08-07

## 2024-08-26 NOTE — PROGRESS NOTES
"Florecita Cabral is a 19 y.o. female who is here for a annual exam.     Periods are regular every 28-30 days, lasting 2-3  days.       Sexually active: NuvaRing  Non-smoker with normal blood pressure    Regular self breast exam: yes  no concerns on her exams    Menstrual History:  OB History          0    Para   0    Term   0       0    AB   0    Living   0         SAB   0    IAB   0    Ectopic   0    Multiple   0    Live Births   0                Patient's last menstrual period was 2024.         Review of Systems  All Normal Review of Systems  Constitutional: no fever, no chills, no recent weight gain, no recent weight loss and no fatigue.    Gastrointestinal: no abdominal pain, no constipation, no nausea, no diarrhea and no vomiting.    Genitourinary: no dysuria, no urinary incontinence, no vaginal dryness, no vaginal itching, no dyspareunia, no pelvic pain, no dysmenorrhea, no sexual problems, no change in urinary frequency, no vaginal discharge, no unexplained vaginal bleeding and no lesion/sore.    Breasts: No masses.  No nipple discharge.  No redness    Objective   /60   Ht 1.6 m (5' 3\")   Wt 67.6 kg (149 lb)   LMP 2024   BMI 26.39 kg/m²     OBGyn Exam   Physical Exam  Constitutional: Alert and in no acute distress. Well developed, well nourished.   Head and Face: Head and face: Normal.    Eyes: Normal external exam - nonicteric sclera, extraocular movements intact (EOMI) and no ptosis.   Ears, Nose, Mouth, and Throat: External inspection of ears and nose: Normal.    Neck: No neck asymmetry. Supple. Thyroid not enlarged and there were no palpable thyroid nodules.   Chest: Breasts: Normal appearance, no nipple discharge and no skin changes. Palpation of breasts and axillae: No palpable mass and no axillary lymphadenopathy.   Abdomen: Soft nontender; no abdominal mass palpated. No organomegaly. No hernias.     Genitourinary:   External genitalia: Normal. No inguinal " lymphadenopathy. Bartholin's Urethral and Skenes Glands: Normal. Urethra: Normal.    Bladder: Normal on palpation.   Vagina: Normal. No discharge  Cervix: Normal.    Uterus: Normal.    Right Adnexa/parametria: Normal.  Left Adnexa/parametria: Normal.    Inspection of Perianal Area: Normal.     Musculoskeletal: No joint swelling seen, normal movements of all extremities.   Skin: Normal skin color and pigmentation, normal skin turgor, and no rash.   Neurologic: Non-focal. Grossly intact.   Psychiatric: Alert and oriented x 3. Affect normal to patient baseline. Mood: Appropriate.      Assessment/Plan   1) Annual exam:  Pap with reflex HPV testing completed  Does not request a refill for her NuvaRing    Thank you again for your visit to our office today  Please follow-up as needed or in 1 year with your annual exam

## 2024-08-27 ENCOUNTER — APPOINTMENT (OUTPATIENT)
Dept: OTOLARYNGOLOGY | Facility: CLINIC | Age: 19
End: 2024-08-27
Payer: COMMERCIAL

## 2024-08-27 VITALS — WEIGHT: 148.9 LBS | BODY MASS INDEX: 26.38 KG/M2 | TEMPERATURE: 97.6 F | HEIGHT: 63 IN

## 2024-08-27 DIAGNOSIS — R13.10 DYSPHAGIA, UNSPECIFIED TYPE: ICD-10-CM

## 2024-08-27 DIAGNOSIS — R09.81 NASAL CONGESTION: Primary | ICD-10-CM

## 2024-08-27 DIAGNOSIS — J34.89 NASAL AND SINUS DISCHARGE: ICD-10-CM

## 2024-08-27 DIAGNOSIS — J34.89 NASAL DRAINAGE: ICD-10-CM

## 2024-08-27 PROCEDURE — 99203 OFFICE O/P NEW LOW 30 MIN: CPT

## 2024-08-27 PROCEDURE — 1036F TOBACCO NON-USER: CPT

## 2024-08-27 PROCEDURE — 31575 DIAGNOSTIC LARYNGOSCOPY: CPT

## 2024-08-27 PROCEDURE — 3008F BODY MASS INDEX DOCD: CPT

## 2024-08-27 RX ORDER — AZELASTINE 1 MG/ML
2 SPRAY, METERED NASAL 2 TIMES DAILY
Qty: 30 ML | Refills: 6 | Status: SHIPPED | OUTPATIENT
Start: 2024-08-27 | End: 2024-09-26

## 2024-08-27 ASSESSMENT — PATIENT HEALTH QUESTIONNAIRE - PHQ9
SUM OF ALL RESPONSES TO PHQ9 QUESTIONS 1 AND 2: 0
1. LITTLE INTEREST OR PLEASURE IN DOING THINGS: NOT AT ALL
2. FEELING DOWN, DEPRESSED OR HOPELESS: NOT AT ALL

## 2024-08-27 NOTE — PROGRESS NOTES
Reason For Consult  Chief Complaint   Patient presents with    New Patient Visit     Serve sinus and throat issues         HISTORY OF PRESENT ILLNESS:  Florecita Cabral, who is a 19 y.o. female presenting for an initial visit for dysphagia and increased sinus drainage.  The patient reports swallowing trouble with mainly pills for the past 2 years. The patient states if she chews solids up thoroughly she can tolerate eating solids, but will notice the feeling of sticking in throat. Patient states she is tolerating liquids. The patient states she has hx of GERD and previously had taken omeprazole with good results and acid reflux feels well controlled. The patient reports increased nasal congestion and post nasal drainage. Patient is currently taking Xyzal. The patient denies any smoking history.     Past Medical History  She has no past medical history on file.  Surgical History  She has no past surgical history on file.     Social History  She reports that she has never smoked. She has never been exposed to tobacco smoke. She has never used smokeless tobacco. She reports that she does not currently use alcohol. She reports that she does not use drugs.    Allergies  Penicillins    Review of Systems  All 10 systems were reviewed and negative except for above.      Physical Exam  CONSTITUTIONAL: Well developed, well nourished.    VOICE: normal  RESPIRATION: Breathing comfortably, no stridor.    NEURO: Alert and oriented x3, cranial nerves II-XII intact and symmetric bilaterally.    EARS: Normal external ears, external auditory canals, normal hearing to conversational voice.    NOSE: External nose midline, anterior rhinoscopy is normal with limited visualization to the anterior aspect of the interior turbinates. No lesions noted.     ORAL CAVITY/OROPHARYNX/LIPS: Normal mucous membranes, normal floor of mouth/tongue/OP, no masses or lesions are noted.    SKIN: Neck skin is normal.   PSYCH: Alert and oriented with  "appropriate mood and affect.        Last Recorded Vitals  Temperature 36.4 °C (97.6 °F), height 1.6 m (5' 3\"), weight 67.5 kg (148 lb 14.4 oz), last menstrual period 2024.    Procedure  PROCEDURE NOTE:  Recommended flexible laryngoscopy/stroboscopy.  Risks, benefits,  and alternatives were explained.  They wish to proceed and provide verbal consent.     PROCEDURE:  Flexible Laryngoscopy, CPT 40303     INDICATIONS: Inability to tolerate mirror exam or abnormal findings on mirror, Flexible Laryngoscopy/Stroboscopy performed to assess one of the followin. Diagnosis of symptomatic disorder involving the voice, swallow, upper aerodigestive tract, including RIYA disorders, or  2. Preoperative evaluation of vocal cord function for individuals undergoing surgery where the RLN or vagus nerves are at risk of injury, or  3. Further evaluation of abnormalities of the upper aerodigestive tract discovered by another modality, such as CT, MRI, bronchoscopy or EGD    Description of Procedure:    After adequate afrin and lidocaine spray, I advanced the endoscope.  Visualization of the nasopharynx, vallecula, posterior pharyngeal walls, pyriform, epiglottis and post cricoid areas was unremarkable.  The following laryngeal findings were noted:    vocal cord movement was normal bilaterally  closure was complete  Mucosal wave was not assessed  Compression was increased AP=FVC   edema was: none  interarytenoid edema mild  lesions were none  the subglottis was widely patent  Pharyngeal wall squeeze was normal  No pooling of secretions    Procedure well tolerated.     ASSESSMENT AND PLAN:   This is an initial visit for dysphagia mainly to solids and increased nasal congestion/discharge. with clinical findings notable for good vocal cord movement and closure. No masses or lesions noted. Good pharyngeal wall squeeze. The patient is student out of state and will be heading back to school tomorrow. Xyzal prescription close to " finished. Plan is to switch to Azelastine and Flonase. Discussed option of MBSS and will hold off for right now and discuss further at follow up per pt wishes. Patient will follow up when returning for winter break. If nasal congestion/drainage persists can refer to rhinology for exam. Patient agreeable to plan. Can contact office if any issues occur while at school.       We discussed the treatment options to include, medical and surgical options.  We have decided to proceed as follows:   Follow up when returning from school.   Flonase and Azelastine, patient not noticing improvement of symptoms on Xyzal. Patient to stop Xyzal.  [place] water gargles daily for mucus management.

## 2024-09-05 LAB
CYTOLOGY CMNT CVX/VAG CYTO-IMP: NORMAL
LAB AP HPV GENOTYPE QUESTION: YES
LAB AP HPV HR: NORMAL
LABORATORY COMMENT REPORT: NORMAL
PATH REPORT.TOTAL CANCER: NORMAL

## 2024-12-16 DIAGNOSIS — Z30.44 ENCOUNTER FOR SURVEILLANCE OF VAGINAL RING HORMONAL CONTRACEPTIVE DEVICE: Primary | ICD-10-CM

## 2024-12-16 RX ORDER — ETONOGESTREL AND ETHINYL ESTRADIOL VAGINAL RING .015; .12 MG/D; MG/D
1 RING VAGINAL
Qty: 3 EACH | Refills: 3 | Status: SHIPPED | OUTPATIENT
Start: 2024-12-16 | End: 2025-12-16

## 2024-12-16 RX ORDER — ETONOGESTREL AND ETHINYL ESTRADIOL VAGINAL RING .015; .12 MG/D; MG/D
1 RING VAGINAL
Qty: 1 EACH | Refills: 0 | Status: SHIPPED | OUTPATIENT
Start: 2024-12-16 | End: 2025-12-16

## 2025-01-02 ENCOUNTER — APPOINTMENT (OUTPATIENT)
Dept: OBSTETRICS AND GYNECOLOGY | Facility: CLINIC | Age: 20
End: 2025-01-02
Payer: COMMERCIAL

## 2025-02-06 DIAGNOSIS — Z30.44 ENCOUNTER FOR SURVEILLANCE OF VAGINAL RING HORMONAL CONTRACEPTIVE DEVICE: ICD-10-CM

## 2025-02-06 RX ORDER — ETONOGESTREL AND ETHINYL ESTRADIOL VAGINAL RING .015; .12 MG/D; MG/D
1 RING VAGINAL
Qty: 3 EACH | Refills: 1 | Status: SHIPPED | OUTPATIENT
Start: 2025-02-06 | End: 2026-02-06

## 2025-05-13 DIAGNOSIS — Z30.44 ENCOUNTER FOR SURVEILLANCE OF VAGINAL RING HORMONAL CONTRACEPTIVE DEVICE: ICD-10-CM

## 2025-05-13 RX ORDER — ETONOGESTREL AND ETHINYL ESTRADIOL VAGINAL RING .015; .12 MG/D; MG/D
1 RING VAGINAL
Qty: 3 EACH | Refills: 1 | Status: SHIPPED | OUTPATIENT
Start: 2025-05-13 | End: 2026-05-13

## 2025-06-17 PROBLEM — E78.2 MIXED HYPERCHOLESTEROLEMIA AND HYPERTRIGLYCERIDEMIA: Status: ACTIVE | Noted: 2025-06-17

## 2025-06-17 PROBLEM — F90.0 ATTENTION DEFICIT HYPERACTIVITY DISORDER (ADHD), PREDOMINANTLY INATTENTIVE TYPE: Status: ACTIVE | Noted: 2025-06-17

## 2025-06-17 PROBLEM — R79.89 ELEVATED TESTOSTERONE LEVEL IN FEMALE: Status: ACTIVE | Noted: 2025-06-17

## 2025-06-17 PROBLEM — Z86.69 HISTORY OF AMBLYOPIA: Status: ACTIVE | Noted: 2025-06-17

## 2025-06-17 PROBLEM — R09.A2 SENSATION OF LUMP IN THROAT: Status: ACTIVE | Noted: 2025-06-17

## 2025-06-17 PROBLEM — E66.811 CLASS 1 OBESITY: Status: ACTIVE | Noted: 2025-06-17

## 2025-06-17 PROBLEM — A04.72 COLITIS DUE TO CLOSTRIDIOIDES DIFFICILE: Status: ACTIVE | Noted: 2025-06-17

## 2025-06-17 PROBLEM — E28.2 POLYCYSTIC OVARY SYNDROME: Status: ACTIVE | Noted: 2025-06-17

## 2025-06-17 PROBLEM — F32.A DEPRESSIVE DISORDER: Status: ACTIVE | Noted: 2025-06-17

## 2025-06-17 PROBLEM — H44.9 DISORDER OF GLOBE: Status: ACTIVE | Noted: 2025-06-17

## 2025-06-17 PROBLEM — Q74.2 ACCESSORY NAVICULAR BONE OF RIGHT FOOT: Status: ACTIVE | Noted: 2025-06-17

## 2025-06-17 PROBLEM — N94.3 PREMENSTRUAL TENSION SYNDROME: Status: ACTIVE | Noted: 2025-06-17

## 2025-06-17 RX ORDER — ISOTRETINOIN 30 MG/1
CAPSULE ORAL
COMMUNITY
Start: 2025-04-03

## 2025-06-17 RX ORDER — ONDANSETRON 4 MG/1
TABLET, ORALLY DISINTEGRATING ORAL
COMMUNITY
Start: 2024-12-15

## 2025-06-17 RX ORDER — FLASH GLUCOSE SENSOR
KIT MISCELLANEOUS
COMMUNITY
Start: 2024-08-27

## 2025-06-17 RX ORDER — AZITHROMYCIN 250 MG/1
TABLET, FILM COATED ORAL
COMMUNITY
Start: 2024-12-10

## 2025-06-17 RX ORDER — SPIRONOLACTONE 25 MG/1
TABLET ORAL
COMMUNITY
Start: 2024-12-10

## 2025-06-17 RX ORDER — FLASH GLUCOSE SENSOR
KIT MISCELLANEOUS
COMMUNITY
Start: 2024-12-17

## 2025-06-17 RX ORDER — EPINEPHRINE 0.3 MG/.3ML
INJECTION SUBCUTANEOUS
COMMUNITY
Start: 2024-12-10

## 2025-06-17 RX ORDER — CROMOLYN SODIUM 100 MG/5ML
SOLUTION, CONCENTRATE ORAL
COMMUNITY
Start: 2024-08-10

## 2025-06-17 RX ORDER — EPINEPHRINE 0.3 MG/.3ML
INJECTION SUBCUTANEOUS
COMMUNITY
Start: 2024-08-07

## 2025-06-17 RX ORDER — DOXYCYCLINE 100 MG/1
1 CAPSULE ORAL
COMMUNITY
Start: 2024-12-10

## 2025-06-19 ENCOUNTER — APPOINTMENT (OUTPATIENT)
Dept: PEDIATRICS | Facility: CLINIC | Age: 20
End: 2025-06-19
Payer: COMMERCIAL

## 2025-06-19 VITALS
WEIGHT: 142.8 LBS | BODY MASS INDEX: 25.3 KG/M2 | DIASTOLIC BLOOD PRESSURE: 64 MMHG | SYSTOLIC BLOOD PRESSURE: 99 MMHG | HEIGHT: 63 IN | HEART RATE: 89 BPM

## 2025-06-19 DIAGNOSIS — Z00.00 WELL ADULT EXAM: Primary | ICD-10-CM

## 2025-06-19 DIAGNOSIS — G47.20 SLEEP-WAKE 24 HOUR CYCLE DISRUPTION: ICD-10-CM

## 2025-06-19 DIAGNOSIS — T78.40XD ALLERGY, SUBSEQUENT ENCOUNTER: ICD-10-CM

## 2025-06-19 DIAGNOSIS — R79.89 HIGH SERUM CORTISOL: ICD-10-CM

## 2025-06-19 PROCEDURE — 99213 OFFICE O/P EST LOW 20 MIN: CPT | Performed by: PEDIATRICS

## 2025-06-19 PROCEDURE — 99395 PREV VISIT EST AGE 18-39: CPT | Performed by: PEDIATRICS

## 2025-06-19 PROCEDURE — 3008F BODY MASS INDEX DOCD: CPT | Performed by: PEDIATRICS

## 2025-06-19 NOTE — PROGRESS NOTES
"The patient is here alone today for routine health maintenance.   General Health: Overall in good health  Concerns today: \"MY STRESS LEVELS THESE LAST 2 WEEKS HAVE GONE THROUGH THE ROOF\"  Significant PMHx: ANXIETY- NO MEDS; BACK ACNE- ACCUTANE; ALLERGIES- ZYRTEC, NASONEX, AZELASTINE; PCOS- ZEPBOUND (HELPS WITH BODY DYSMORPHIA); HX OF C DIFF; PCOS- NUVA RING  Interim Hx:     Nutrition: FUNCTIONAL MED HAS HER OFF EGGS, APPLES, PEACHES, PEARS, SHELLFISH. ONLY BERRIES, GRAPES, ASPARAGUS FOR PRODUCE. ALSO TOLD HER SHE HAS WHEAT INTOLERANCE D/T GRASS ALLERGY. EATS PROTEIN (LOTS OF RED MEAT)  Dental Care: Has a dental home. Performs regular dental hygiene.  Sleep: HS 9PM, TAKES 90 MIN TO FALL ASLEEP. WAKES AT 3-4AM EVERY MORNING. WAS TAKING MELATONIN (\"BUT IT STOPPED WORKING\"). SAYS SHE'S ALWAYS TIRED.   Behavior/ Socialization: Appropriate peer relationships. Parent-child-sibling interactions are normal. Has supportive adult relationship(s). Lives at HOME CURRENTLY, LOOKING FOR WORK. \"MY DAD GAVE ME 2 WEEKS\".   Developmental: Does not receive educational accommodations. Social interaction is age-appropriate.   Education: JUST GRADUATED AS A FILM-MAKER.   Work: WANTS TO WORK IN [place].   Activities: GYM 3-4 DAYS/WEEK.   Risk Assessment: Teen questionnaire completed and did not flag as abnormal.  Menstrual Status: Periods are regular Q month. LMP 2 WEEKS AGO.  Sex:   Drugs/Alcohol Use:   Mental Health Assessment: Screening questionnaire for depression negative. Does not endorse thoughts of suicide or self-harm.  Safety: Uses safety belts in cars.     ROS:  Denies headaches, dizziness, syncope/ near syncope, stuffy/runny nose, sneezing, sore throat, coughing, chest pain, abnormal heart rate, abdominal pain, N/V/D, rashes, pain in extremities.      Travel Screening    6/18/2025  7:43 PM EDT - Filed by Patient   Do you have any of the following new or worsening symptoms? None of these   Have you recently been in contact with " "someone who was sick? No / Unsure          BP 99/64   Pulse 89   Ht 1.6 m (5' 3\")   Wt 64.8 kg (142 lb 12.8 oz)   BMI 25.30 kg/m²   Growth percentiles: Facility age limit for growth %deborah is 20 years. Facility age limit for growth %deborah is 20 years.   Constitutional: Well-developed, well nourished, adequately hydrated. No acute distress.   Head/face: Normocephalic, atraumatic.  Eyes: Conjunctivae, sclerae, and lids WNL bilaterally. PERRL. EOMI.  ENT: No nasal discharge, TMs with normal color, landmarks, and reflectivity, without MEEs or retraction. EACs without edema, redness, or tenderness. Dentition intact. MMM. Tonsils WNL.  Neck: FROM, no significant cervical SARAH.  CV: Normal S1 and S2, RRR without M/R/G.  Pulm: No G/F/R. Easy, unlabored respirations without W/R/R/C. Good air exchange all over.   Abd: Soft, NT/ND, no HSM, no masses. Normal BS and tympany on exam.  : Normal exam for stated age and gender.  Neuro: CN grossly intact. Normal gait. Reflexes 2+ and symmetric.  Psych: Mood and affect normal.   Skin: ERYTHEMATOUS ACNE (SOME ACTIVE, SOME SCARS) ALL OVER UPPER BACK (IN DISTRIBUTION OF LONG HAIR)      Healthy young adult!!  - Vaccines today: NONE NEEDED  - SLEEP: SOUNDS LIKE YOU NEED TO TALK TO A SPECIALIST ABOUT THIS. REFERRAL TO SLEEP MEDICINE IN THE SYSTEM.   - RULE-OUT CUSHINGS: WILL REFER TO ENDOCRINE FOR REPORTED ELEVATED CORTISOL LEVELS  - PCOS: CONTINUE TO WORK WITH GYNECOLOGY, CONTINUE ZEPBOUND INJECTIONS  - BACK ACNE: CONTINUE ACCUTANE PER DERM, CONSIDER RESURFACING WITH PLASTIC SURGERY AFTER THAT  - ALLERGIES: CONTINUE ZYRTEC, NASAL SPRAYS, AVOIDANCE OF KNOWN TRIGGERS. REFER TO LOCAL ALLERGIES FOR SHOTS.   - ANXIETY: CONTINUE TALK THERAPY FOR NOW, CONSIDER MEDS IF THIS ISN'T DOING THE TRICK.   - See you next year.   Vanessa Saenz MD   "

## 2025-06-19 NOTE — PATIENT INSTRUCTIONS
Healthy young adult!!  - Vaccines today: NONE NEEDED  - SLEEP: SOUNDS LIKE YOU NEED TO TALK TO A SPECIALIST ABOUT THIS. REFERRAL TO SLEEP MEDICINE IN THE SYSTEM.   - RULE-OUT CUSHINGS: WILL REFER TO ENDOCRINE FOR REPORTED ELEVATED CORTISOL LEVELS  - PCOS: CONTINUE TO WORK WITH GYNECOLOGY, CONTINUE ZEPBOUND INJECTIONS  - BACK ACNE: CONTINUE ACCUTANE PER DERM, CONSIDER RESURFACING WITH PLASTIC SURGERY AFTER THAT  - ALLERGIES: CONTINUE ZYRTEC, NASAL SPRAYS, AVOIDANCE OF KNOWN TRIGGERS. REFER TO LOCAL ALLERGIES FOR SHOTS.   - ANXIETY: CONTINUE TALK THERAPY FOR NOW, CONSIDER MEDS IF THIS ISN'T DOING THE TRICK.   - See you next year.

## 2025-07-07 DIAGNOSIS — Z30.44 ENCOUNTER FOR SURVEILLANCE OF VAGINAL RING HORMONAL CONTRACEPTIVE DEVICE: ICD-10-CM

## 2025-07-07 NOTE — TELEPHONE ENCOUNTER
Macaela Salomon called in stating that she is completely out of birth control and needs Rx sent to pharmacy.      Last Office Visit: 08/26/24  Next Office Visit: 08/28/25  Med:etonogestreL-ethinyl estradioL (Nuvaring) 0.12-0.015 mg/24 hr vaginal ring   Pharmacy: [facility]  [address], [place] [address]     Celine Erazo MA

## 2025-07-08 RX ORDER — ETONOGESTREL AND ETHINYL ESTRADIOL VAGINAL RING .015; .12 MG/D; MG/D
1 RING VAGINAL
Qty: 3 EACH | Refills: 0 | Status: SHIPPED | OUTPATIENT
Start: 2025-07-08 | End: 2026-07-08

## 2025-07-21 ENCOUNTER — APPOINTMENT (OUTPATIENT)
Dept: OTOLARYNGOLOGY | Facility: CLINIC | Age: 20
End: 2025-07-21
Payer: COMMERCIAL

## 2025-07-21 VITALS — HEIGHT: 63 IN | WEIGHT: 135 LBS | BODY MASS INDEX: 23.92 KG/M2

## 2025-07-21 DIAGNOSIS — R22.0 FACIAL SWELLING: ICD-10-CM

## 2025-07-21 DIAGNOSIS — J34.89 NASAL AND SINUS DISCHARGE: ICD-10-CM

## 2025-07-21 DIAGNOSIS — J34.89 SINUS PRESSURE: ICD-10-CM

## 2025-07-21 DIAGNOSIS — H66.002 NON-RECURRENT ACUTE SUPPURATIVE OTITIS MEDIA OF LEFT EAR WITHOUT SPONTANEOUS RUPTURE OF TYMPANIC MEMBRANE: ICD-10-CM

## 2025-07-21 DIAGNOSIS — R13.10 DYSPHAGIA, UNSPECIFIED TYPE: Primary | ICD-10-CM

## 2025-07-21 PROCEDURE — 3008F BODY MASS INDEX DOCD: CPT

## 2025-07-21 PROCEDURE — 99214 OFFICE O/P EST MOD 30 MIN: CPT

## 2025-07-21 PROCEDURE — 1036F TOBACCO NON-USER: CPT

## 2025-07-21 RX ORDER — AZITHROMYCIN 250 MG/1
TABLET, FILM COATED ORAL
Qty: 6 TABLET | Refills: 0 | Status: SHIPPED | OUTPATIENT
Start: 2025-07-21 | End: 2025-07-26

## 2025-07-21 ASSESSMENT — PATIENT HEALTH QUESTIONNAIRE - PHQ9
1. LITTLE INTEREST OR PLEASURE IN DOING THINGS: NOT AT ALL
2. FEELING DOWN, DEPRESSED OR HOPELESS: NOT AT ALL
SUM OF ALL RESPONSES TO PHQ9 QUESTIONS 1 AND 2: 0

## 2025-07-21 NOTE — PROGRESS NOTES
Chief Complaint  Chief Complaint   Patient presents with    Follow-up     Sinus congestion and swelling.        Pertinent History:  Seen on 8/27/24: Florecita Cabral, who is a 19 y.o. female presenting for an initial visit for dysphagia and increased sinus drainage.  The patient reports swallowing trouble with mainly pills for the past 2 years. The patient states if she chews solids up thoroughly she can tolerate eating solids, but will notice the feeling of sticking in throat. Patient states she is tolerating liquids. The patient states she has hx of GERD and previously had taken omeprazole with good results and acid reflux feels well controlled. The patient reports increased nasal congestion and post nasal drainage. Patient is currently taking Xyzal. The patient denies any smoking history   Interval History  Since last visit patient reports her swallowing issues have improved, but will still experience issues when swallowing pills. Patient reports tolerating solids and liquids. Patient in today for complaints of maxillary sinus pressure, nasal congestion, and drainage. Patient reports swelling of her face at maxillary sinuses. Patient was started on Flonase and azelastine at last visit. Patient stopped Xyzal at last visit due to little improvement. Patient reports she will still feel sticking and globus sensation in the throat when swallowing. Patient reports breathing is stable. Patient reports some deepening in the voice. Patient reports having left ear pain and pressure in office today. Denies any symptoms of acid reflux. No smoking history.     Exam:  VOICE: hoarseness not present  RESPIRATION: Breathing comfortably, no stridor.    ORAL CAVITY/OROPHARYNX/LIPS: Normal mucous membranes, normal floor of mouth/tongue/OP, no masses or lesions are noted.    SKIN: Neck skin is without scar or injury.    PSYCH: Alert and oriented with appropriate mood and affect.       Assessment and Plan:  This is a follow up visit  for dysphagia to pills, sinus issues, and ear pain on the left. Will order modified barium swallow for swallowing issues and call patient with results. Patient had CT of the neck 4.21.25. Reports of some maxillary and ethmoid sinus thickening. Will have patient seen by Rhinology for the sinus related issues. Will send order for azithromycin for left otitis media. Patient has allergies to penicillins. Will call with results of swallow study. Patient agreeable to plan. All questions answered.

## 2025-07-30 ENCOUNTER — APPOINTMENT (OUTPATIENT)
Dept: OTOLARYNGOLOGY | Facility: CLINIC | Age: 20
End: 2025-07-30
Payer: COMMERCIAL

## 2025-07-30 DIAGNOSIS — J34.89 NASAL OBSTRUCTION: ICD-10-CM

## 2025-07-30 DIAGNOSIS — J33.1 POLYPOID SINUS DEGENERATION: Primary | ICD-10-CM

## 2025-07-30 DIAGNOSIS — J34.2 NASAL SEPTAL DEVIATION: ICD-10-CM

## 2025-07-30 DIAGNOSIS — J32.0 CHRONIC MAXILLARY SINUSITIS: ICD-10-CM

## 2025-07-30 PROCEDURE — 99214 OFFICE O/P EST MOD 30 MIN: CPT | Performed by: NURSE PRACTITIONER

## 2025-07-30 PROCEDURE — 1036F TOBACCO NON-USER: CPT | Performed by: NURSE PRACTITIONER

## 2025-07-30 PROCEDURE — 31231 NASAL ENDOSCOPY DX: CPT | Performed by: NURSE PRACTITIONER

## 2025-07-30 RX ORDER — FLUTICASONE PROPIONATE 93 UG/1
SPRAY, METERED NASAL
Qty: 16 ML | Refills: 11 | Status: SHIPPED | OUTPATIENT
Start: 2025-07-30

## 2025-07-30 NOTE — PATIENT INSTRUCTIONS
Today you were evaluated by Mary Jane Myers CNP.    Please follow-up in 3 months or sooner if needed. If you have any questions or concerns, please contact my office at [phone].     We have ordered Xhance, a steroid nasal spray, for you to begin. This medication is administered 1 spray to each nostril twice daily via a specialized delivery device that helps get the medication further into your nose.     Please watch the demonstration video at [email or web address] prior to starting the medication.    Use 1 spray each nostril twice daily.     Your prescription will be filled by [facility].  They will call you and proceed with further instructions.     If you have not heard from them within 48 hours, please contact my office to assist you.     Continue azelastine.  Stop flonase.

## 2025-07-30 NOTE — PROGRESS NOTES
Subjective   Patient ID: Florecita Cabral is a 20 y.o. female who presents for No chief complaint on file..  HPI  Florecita Cabral is a 20 y.o. old female   presenting with complaints of sinus and nose problems that began a few years ago. The patient describes the sinus/nose problems as gradual onset of facial pressure, drainage (posterior). She has a decrease in her sense of smell. She has bilateral, alternating nasal obstruction. Patient denies facial pain, periorbital edema, throat clearing, hoarseness, loss of taste, and loss of smell. The patient denies epistaxis. The patient has taken antibiotics, flonase, azelastine medications to alleviate the problem. The medications have minimally helped. The patient has tried nasal saline irrigations. Does have a history of allergic rhinitis or allergies. Positive to dogs, cats, grass. They deny a history of aspirin sensitivity. They report 2 sinus infections per year requiring antibiotic treatment. Most recent antibiotic usage includes: Z-pack, just completed.    History of prior nasal/sinus surgery or procedure: Denies    I have also reviewed prior note from Octaviano Sage CNP dated 7/21/25 and this is contributing to my history and assessment.     I personally reviewed the patients CT scan results. I discussed the results personally with the patient. The following findings were discussed: 4/25/25: CT Neck: Report reads mild mucosal thickening in the maxillary and ethmoid sinuses.    Review of Systems  Review of systems is negative for constitutional, ophthalmological, cardiac, pulmonary, renal, gastrointestinal, musculoskeletal, mental health, endocrine, or neurologic disorders (except as listed in the HPI, PMH, and Problem List).     Objective   Physical Exam  CONSTITUTIONAL: Vital signs reviewed. Patient appears well developed and well nourished.   GENERAL: this is a healthy appearing female who appears stated age. The patient is alert and appropriately verbally  conversant without hoarseness. This patient is in no apparent distress.   FACE: The face was inspected and no cutaneous masses or lesions were visualized. There was no erythema or edema noted. Facial movement was symmetric. No skin lesions were detected. There was no sinus tenderness elicited. TMJ crepitus absent.   EYES: Extra-ocular muscle function was intact. No nystagmus was observed. Pupils were equal.   CRANIAL NERVES: Cranial nerves II, III, IV, and VI were noted to be intact via extra-ocular muscle movement testing. Cranial nerve VII noted to be intact and symmetric by facial movement. Cranial nerves IX and X noted to be intact by gag reflex and palatal movement. Cranial nerve XII noted to be intact by active and symmetric tongue movement.   NOSE: Examination of the nose revealed the nasal dorsum to be midline. Intranasal exam reveals the septum is deviated left. The inferior turbinates were hypertrophic. No masses, polyps, mucopus, or other lesion on anterior rhinoscopy. See below procedure note as applicable for further exam.  ORAL CAVITY: Examination of the oral cavity revealed no mass lesions nor infection. The palate was noted to be intact. The tongue exhibited normal mobility. Mucosa was moist without lesion. The lips were free of lesion. Gums were free of inflammation. Dentition: normal without obvious infection or inflammation  OROPHARYNX: The oral pharynx was free of mass lesion or mucosal abnormality. The palate was noted to be without lesion. The uvula was normal appearing. The tonsils were Normal.  EARS: Examination of the ears revealed that the auricles were normally formed with no lesions. The external auditory canals were normal. The tympanic membranes were intact.  There is no inflammation visualized.   NECK: Visualization and palpation of the neck revealed no mass lesions. No skin lesions or inflammatory processes were detected. The cervical musculature was normal to palpation.   CERVICAL  LYMPHATICS: There were no palpable lymph nodes in the posterior triangle, submandibular triangle, jugulodigastric region, or central neck.  RESPIRATORY: Normal inspiration and expiration and chest wall expansion, no use of accessory muscles to breathe, no stridor.  NEUROLOGICAL: Patient is ambulatory without assist. Mentation is clear. Answering questions appropriately.     Nasal / sinus endoscopy    Date/Time: 7/30/2025 12:05 PM    Performed by: CHALINO San  Authorized by: CHALINO San    Consent:     Consent obtained:  Verbal    Consent given by:  Patient    Risks discussed:  Bleeding, infection and pain    Alternatives discussed:  No treatment, observation and delayed treatment  Procedure details:     Indications: assessment of airway and sino-nasal symptoms      Medication:  Afrin and Kvng-Synephrine 2%    Instrument: flexible fiberoptic nasal endoscope      Scope location: bilateral nare    Post-procedure details:     Patient tolerance of procedure:  Tolerated well, no immediate complications  Comments:      Findings: After topical decongestion with decongestant and anesthetic spray, nasal endoscopy was performed using an endoscope. The septum was deviated left. The inferior turbinates were hypertrophic.  The middle turbinates appeared edematous with polypoid changes to the middle turbinates, the middle meatus is free of purulence, masses, lesions or polyps. The superior meatus and sphenoethmoid recess are clear bilaterally. The nasal passageway is patent. The nasopharynx was clear. There were no complications and the patient tolerated the procedure well.         Assessment/Plan     Florecita Cabral is a 20 y.o. year old female with symptoms and clinical findings consistent with chronic rhinitis, chronic sinusitis. Patient with a mild left nasal septal deviation and polypoid changes to the middle turbinates noted. Rec. Altering to Xhance, seeing allergy.      Plan:  Nasal  endoscopy: Findings: polypoid middle turbinates, left dev  I personally reviewed the patients CT scan results. I discussed the results personally with the patient. The following findings were discussed: 4/25/25: CT Neck: Report reads mild mucosal thickening in the maxillary and ethmoid sinuses.  I discussed the findings the patient and offered reassurance and counseling.  We agreed to proceed with therapeutic measures to address the issues noted above.   1. We will have the patient start a stronger steroid nasal spray to help improve nasal symptoms. Prescription for Xhance was given. They were instructed to use the spray 1 puff in each nostril twice daily. She was also instructed on the appropriate use of the medication, by point it towards the lateral wall of the nose/corner of the eye on the same side.    2. I recommended the patient continue azelastine nasal spray.   3. Upon receiving her imaging, I will message her via [employer] with any changes to therapy.  4. She should keep scheduled appointment with allergy.   5. Patient will follow-up in 3 months to assess for benefit of therapies and for further management.  All questions were answered and patient agrees with established plan of care.

## 2025-08-28 ENCOUNTER — APPOINTMENT (OUTPATIENT)
Dept: OBSTETRICS AND GYNECOLOGY | Facility: CLINIC | Age: 20
End: 2025-08-28
Payer: COMMERCIAL

## 2025-10-20 ENCOUNTER — APPOINTMENT (OUTPATIENT)
Dept: OBSTETRICS AND GYNECOLOGY | Facility: CLINIC | Age: 20
End: 2025-10-20
Payer: COMMERCIAL

## 2025-10-29 ENCOUNTER — APPOINTMENT (OUTPATIENT)
Dept: OTOLARYNGOLOGY | Facility: CLINIC | Age: 20
End: 2025-10-29
Payer: COMMERCIAL

## 2025-11-04 ENCOUNTER — APPOINTMENT (OUTPATIENT)
Dept: ENDOCRINOLOGY | Facility: CLINIC | Age: 20
End: 2025-11-04
Payer: COMMERCIAL

## 2025-12-05 ENCOUNTER — APPOINTMENT (OUTPATIENT)
Dept: ALLERGY | Facility: CLINIC | Age: 20
End: 2025-12-05
Payer: COMMERCIAL

## 2025-12-23 ENCOUNTER — APPOINTMENT (OUTPATIENT)
Dept: ENDOCRINOLOGY | Facility: CLINIC | Age: 20
End: 2025-12-23
Payer: COMMERCIAL